# Patient Record
Sex: FEMALE | Race: WHITE | Employment: OTHER
[De-identification: names, ages, dates, MRNs, and addresses within clinical notes are randomized per-mention and may not be internally consistent; named-entity substitution may affect disease eponyms.]

---

## 2017-01-11 RX ORDER — POTASSIUM CHLORIDE 20 MEQ/1
20 TABLET, EXTENDED RELEASE ORAL 2 TIMES DAILY
Qty: 180 TABLET | Refills: 1 | Status: SHIPPED | OUTPATIENT
Start: 2017-01-11 | End: 2017-09-10 | Stop reason: SDUPTHER

## 2017-01-11 RX ORDER — LUBIPROSTONE 24 UG/1
24 CAPSULE, GELATIN COATED ORAL 2 TIMES DAILY
Qty: 180 CAPSULE | Refills: 1 | Status: SHIPPED | OUTPATIENT
Start: 2017-01-11 | End: 2017-05-22 | Stop reason: SDUPTHER

## 2017-02-07 ENCOUNTER — PROCEDURE VISIT (OUTPATIENT)
Dept: PODIATRY | Facility: CLINIC | Age: 82
End: 2017-02-07

## 2017-02-07 VITALS — BODY MASS INDEX: 20.49 KG/M2 | HEIGHT: 64 IN | WEIGHT: 120 LBS

## 2017-02-07 DIAGNOSIS — B35.1 DERMATOPHYTOSIS OF NAIL: Primary | ICD-10-CM

## 2017-02-07 DIAGNOSIS — M79.675 PAIN IN TOES OF BOTH FEET: ICD-10-CM

## 2017-02-07 DIAGNOSIS — M79.674 PAIN IN TOES OF BOTH FEET: ICD-10-CM

## 2017-02-07 PROCEDURE — 11721 DEBRIDE NAIL 6 OR MORE: CPT | Performed by: PODIATRIST

## 2017-02-07 PROCEDURE — 1036F TOBACCO NON-USER: CPT | Performed by: PODIATRIST

## 2017-02-21 ENCOUNTER — OFFICE VISIT (OUTPATIENT)
Dept: INTERNAL MEDICINE | Facility: CLINIC | Age: 82
End: 2017-02-21

## 2017-02-21 VITALS
WEIGHT: 113.8 LBS | HEIGHT: 64 IN | RESPIRATION RATE: 16 BRPM | SYSTOLIC BLOOD PRESSURE: 96 MMHG | DIASTOLIC BLOOD PRESSURE: 46 MMHG | HEART RATE: 64 BPM | BODY MASS INDEX: 19.43 KG/M2

## 2017-02-21 DIAGNOSIS — I10 ESSENTIAL HYPERTENSION: Primary | Chronic | ICD-10-CM

## 2017-02-21 DIAGNOSIS — I48.20 CHRONIC ATRIAL FIBRILLATION (HCC): ICD-10-CM

## 2017-02-21 DIAGNOSIS — I25.10 CORONARY ARTERY DISEASE INVOLVING NATIVE CORONARY ARTERY OF NATIVE HEART WITHOUT ANGINA PECTORIS: ICD-10-CM

## 2017-02-21 DIAGNOSIS — M15.9 PRIMARY OSTEOARTHRITIS INVOLVING MULTIPLE JOINTS: ICD-10-CM

## 2017-02-21 PROCEDURE — G8419 CALC BMI OUT NRM PARAM NOF/U: HCPCS | Performed by: INTERNAL MEDICINE

## 2017-02-21 PROCEDURE — 1123F ACP DISCUSS/DSCN MKR DOCD: CPT | Performed by: INTERNAL MEDICINE

## 2017-02-21 PROCEDURE — 1090F PRES/ABSN URINE INCON ASSESS: CPT | Performed by: INTERNAL MEDICINE

## 2017-02-21 PROCEDURE — G8427 DOCREV CUR MEDS BY ELIG CLIN: HCPCS | Performed by: INTERNAL MEDICINE

## 2017-02-21 PROCEDURE — 4040F PNEUMOC VAC/ADMIN/RCVD: CPT | Performed by: INTERNAL MEDICINE

## 2017-02-21 PROCEDURE — 99214 OFFICE O/P EST MOD 30 MIN: CPT | Performed by: INTERNAL MEDICINE

## 2017-02-21 PROCEDURE — G8484 FLU IMMUNIZE NO ADMIN: HCPCS | Performed by: INTERNAL MEDICINE

## 2017-02-21 PROCEDURE — G8598 ASA/ANTIPLAT THER USED: HCPCS | Performed by: INTERNAL MEDICINE

## 2017-02-21 PROCEDURE — 1036F TOBACCO NON-USER: CPT | Performed by: INTERNAL MEDICINE

## 2017-02-21 RX ORDER — HYDROCODONE BITARTRATE AND ACETAMINOPHEN 5; 325 MG/1; MG/1
1 TABLET ORAL EVERY 6 HOURS PRN
Qty: 50 TABLET | Refills: 0 | Status: SHIPPED | OUTPATIENT
Start: 2017-02-21 | End: 2017-06-27 | Stop reason: SDUPTHER

## 2017-02-21 ASSESSMENT — ENCOUNTER SYMPTOMS
ORTHOPNEA: 0
BLURRED VISION: 0
CONSTIPATION: 0
WHEEZING: 1
SHORTNESS OF BREATH: 0
ABDOMINAL PAIN: 0

## 2017-05-22 RX ORDER — LUBIPROSTONE 24 UG/1
CAPSULE, GELATIN COATED ORAL
Qty: 180 CAPSULE | Refills: 3 | Status: SHIPPED | OUTPATIENT
Start: 2017-05-22 | End: 2017-12-29 | Stop reason: ALTCHOICE

## 2017-06-21 ENCOUNTER — PROCEDURE VISIT (OUTPATIENT)
Dept: PODIATRY | Age: 82
End: 2017-06-21
Payer: MEDICARE

## 2017-06-21 VITALS
HEART RATE: 85 BPM | BODY MASS INDEX: 20.49 KG/M2 | WEIGHT: 120 LBS | HEIGHT: 64 IN | DIASTOLIC BLOOD PRESSURE: 62 MMHG | SYSTOLIC BLOOD PRESSURE: 113 MMHG

## 2017-06-21 DIAGNOSIS — B35.1 DERMATOPHYTOSIS OF NAIL: Primary | ICD-10-CM

## 2017-06-21 DIAGNOSIS — M79.674 PAIN IN TOES OF BOTH FEET: ICD-10-CM

## 2017-06-21 DIAGNOSIS — M79.675 PAIN IN TOES OF BOTH FEET: ICD-10-CM

## 2017-06-21 PROCEDURE — 11721 DEBRIDE NAIL 6 OR MORE: CPT | Performed by: PODIATRIST

## 2017-06-21 PROCEDURE — 1036F TOBACCO NON-USER: CPT | Performed by: PODIATRIST

## 2017-06-21 RX ORDER — DILTIAZEM HYDROCHLORIDE 120 MG/1
CAPSULE, COATED, EXTENDED RELEASE ORAL
COMMUNITY
Start: 2017-05-22 | End: 2018-08-20 | Stop reason: ALTCHOICE

## 2017-06-27 ENCOUNTER — OFFICE VISIT (OUTPATIENT)
Dept: PRIMARY CARE CLINIC | Age: 82
End: 2017-06-27
Payer: MEDICARE

## 2017-06-27 VITALS
BODY MASS INDEX: 19.29 KG/M2 | DIASTOLIC BLOOD PRESSURE: 80 MMHG | HEART RATE: 64 BPM | HEIGHT: 64 IN | SYSTOLIC BLOOD PRESSURE: 138 MMHG | WEIGHT: 113 LBS | RESPIRATION RATE: 14 BRPM

## 2017-06-27 DIAGNOSIS — I10 ESSENTIAL HYPERTENSION: Primary | Chronic | ICD-10-CM

## 2017-06-27 DIAGNOSIS — R32 URINARY INCONTINENCE, UNSPECIFIED TYPE: ICD-10-CM

## 2017-06-27 DIAGNOSIS — Z23 NEED FOR PROPHYLACTIC VACCINATION AGAINST STREPTOCOCCUS PNEUMONIAE (PNEUMOCOCCUS): ICD-10-CM

## 2017-06-27 DIAGNOSIS — I48.20 CHRONIC ATRIAL FIBRILLATION (HCC): ICD-10-CM

## 2017-06-27 DIAGNOSIS — G31.9 CEREBRAL ATROPHY (HCC): ICD-10-CM

## 2017-06-27 DIAGNOSIS — R53.83 FATIGUE, UNSPECIFIED TYPE: ICD-10-CM

## 2017-06-27 PROCEDURE — G8420 CALC BMI NORM PARAMETERS: HCPCS | Performed by: INTERNAL MEDICINE

## 2017-06-27 PROCEDURE — 99214 OFFICE O/P EST MOD 30 MIN: CPT | Performed by: INTERNAL MEDICINE

## 2017-06-27 PROCEDURE — 1036F TOBACCO NON-USER: CPT | Performed by: INTERNAL MEDICINE

## 2017-06-27 PROCEDURE — G0009 ADMIN PNEUMOCOCCAL VACCINE: HCPCS | Performed by: INTERNAL MEDICINE

## 2017-06-27 PROCEDURE — 0509F URINE INCON PLAN DOCD: CPT | Performed by: INTERNAL MEDICINE

## 2017-06-27 PROCEDURE — 4040F PNEUMOC VAC/ADMIN/RCVD: CPT | Performed by: INTERNAL MEDICINE

## 2017-06-27 PROCEDURE — G8427 DOCREV CUR MEDS BY ELIG CLIN: HCPCS | Performed by: INTERNAL MEDICINE

## 2017-06-27 PROCEDURE — G8598 ASA/ANTIPLAT THER USED: HCPCS | Performed by: INTERNAL MEDICINE

## 2017-06-27 PROCEDURE — 1123F ACP DISCUSS/DSCN MKR DOCD: CPT | Performed by: INTERNAL MEDICINE

## 2017-06-27 PROCEDURE — 1090F PRES/ABSN URINE INCON ASSESS: CPT | Performed by: INTERNAL MEDICINE

## 2017-06-27 PROCEDURE — 90670 PCV13 VACCINE IM: CPT | Performed by: INTERNAL MEDICINE

## 2017-06-27 RX ORDER — SOLIFENACIN SUCCINATE 5 MG/1
5 TABLET, FILM COATED ORAL DAILY
Qty: 30 TABLET | Refills: 3 | Status: SHIPPED | OUTPATIENT
Start: 2017-06-27 | End: 2017-08-07

## 2017-06-27 RX ORDER — HYDROCODONE BITARTRATE AND ACETAMINOPHEN 5; 325 MG/1; MG/1
1 TABLET ORAL EVERY 6 HOURS PRN
Qty: 50 TABLET | Refills: 0 | Status: SHIPPED | OUTPATIENT
Start: 2017-06-27 | End: 2017-10-24 | Stop reason: SDUPTHER

## 2017-06-27 ASSESSMENT — ENCOUNTER SYMPTOMS
BLURRED VISION: 0
ORTHOPNEA: 0
CONSTIPATION: 0
WHEEZING: 0
ABDOMINAL PAIN: 0
SHORTNESS OF BREATH: 0

## 2017-08-07 ENCOUNTER — TELEPHONE (OUTPATIENT)
Dept: PRIMARY CARE CLINIC | Age: 82
End: 2017-08-07

## 2017-08-07 RX ORDER — OXYBUTYNIN CHLORIDE 5 MG/1
5 TABLET ORAL 3 TIMES DAILY
Qty: 90 TABLET | Refills: 3 | Status: SHIPPED | OUTPATIENT
Start: 2017-08-07 | End: 2018-08-20

## 2017-09-11 RX ORDER — POTASSIUM CHLORIDE 20 MEQ/1
TABLET, EXTENDED RELEASE ORAL
Qty: 180 TABLET | Refills: 3 | Status: SHIPPED | OUTPATIENT
Start: 2017-09-11 | End: 2018-08-27 | Stop reason: ALTCHOICE

## 2017-10-24 ENCOUNTER — OFFICE VISIT (OUTPATIENT)
Dept: PRIMARY CARE CLINIC | Age: 82
End: 2017-10-24
Payer: MEDICARE

## 2017-10-24 VITALS
HEART RATE: 100 BPM | RESPIRATION RATE: 18 BRPM | SYSTOLIC BLOOD PRESSURE: 128 MMHG | DIASTOLIC BLOOD PRESSURE: 62 MMHG | WEIGHT: 114.2 LBS | BODY MASS INDEX: 19.5 KG/M2 | HEIGHT: 64 IN

## 2017-10-24 DIAGNOSIS — M15.9 PRIMARY OSTEOARTHRITIS INVOLVING MULTIPLE JOINTS: ICD-10-CM

## 2017-10-24 DIAGNOSIS — Z23 NEED FOR INFLUENZA VACCINATION: ICD-10-CM

## 2017-10-24 DIAGNOSIS — I10 ESSENTIAL HYPERTENSION: Primary | Chronic | ICD-10-CM

## 2017-10-24 DIAGNOSIS — I48.20 CHRONIC ATRIAL FIBRILLATION (HCC): ICD-10-CM

## 2017-10-24 DIAGNOSIS — M81.0 AGE-RELATED OSTEOPOROSIS WITHOUT CURRENT PATHOLOGICAL FRACTURE: ICD-10-CM

## 2017-10-24 DIAGNOSIS — Z95.0 PACEMAKER: ICD-10-CM

## 2017-10-24 PROCEDURE — 4040F PNEUMOC VAC/ADMIN/RCVD: CPT | Performed by: NURSE PRACTITIONER

## 2017-10-24 PROCEDURE — 1123F ACP DISCUSS/DSCN MKR DOCD: CPT | Performed by: NURSE PRACTITIONER

## 2017-10-24 PROCEDURE — G8598 ASA/ANTIPLAT THER USED: HCPCS | Performed by: NURSE PRACTITIONER

## 2017-10-24 PROCEDURE — 4005F PHARM THX FOR OP RXD: CPT | Performed by: NURSE PRACTITIONER

## 2017-10-24 PROCEDURE — G8427 DOCREV CUR MEDS BY ELIG CLIN: HCPCS | Performed by: NURSE PRACTITIONER

## 2017-10-24 PROCEDURE — G8510 SCR DEP NEG, NO PLAN REQD: HCPCS | Performed by: NURSE PRACTITIONER

## 2017-10-24 PROCEDURE — 3288F FALL RISK ASSESSMENT DOCD: CPT | Performed by: NURSE PRACTITIONER

## 2017-10-24 PROCEDURE — G0008 ADMIN INFLUENZA VIRUS VAC: HCPCS | Performed by: NURSE PRACTITIONER

## 2017-10-24 PROCEDURE — 90688 IIV4 VACCINE SPLT 0.5 ML IM: CPT | Performed by: NURSE PRACTITIONER

## 2017-10-24 PROCEDURE — 99214 OFFICE O/P EST MOD 30 MIN: CPT | Performed by: NURSE PRACTITIONER

## 2017-10-24 PROCEDURE — 1090F PRES/ABSN URINE INCON ASSESS: CPT | Performed by: NURSE PRACTITIONER

## 2017-10-24 PROCEDURE — G8484 FLU IMMUNIZE NO ADMIN: HCPCS | Performed by: NURSE PRACTITIONER

## 2017-10-24 PROCEDURE — G8420 CALC BMI NORM PARAMETERS: HCPCS | Performed by: NURSE PRACTITIONER

## 2017-10-24 PROCEDURE — 1036F TOBACCO NON-USER: CPT | Performed by: NURSE PRACTITIONER

## 2017-10-24 RX ORDER — HYDROCODONE BITARTRATE AND ACETAMINOPHEN 5; 325 MG/1; MG/1
1 TABLET ORAL EVERY 6 HOURS PRN
Qty: 50 TABLET | Refills: 0 | Status: SHIPPED | OUTPATIENT
Start: 2017-10-24 | End: 2018-08-20 | Stop reason: ALTCHOICE

## 2017-10-24 RX ORDER — ZOLEDRONIC ACID 5 MG/100ML
5 INJECTION, SOLUTION INTRAVENOUS ONCE
Qty: 100 ML | Refills: 0 | Status: SHIPPED | OUTPATIENT
Start: 2017-10-24 | End: 2018-08-20 | Stop reason: ALTCHOICE

## 2017-10-24 ASSESSMENT — ENCOUNTER SYMPTOMS
NAUSEA: 0
WHEEZING: 0
ABDOMINAL PAIN: 0
TROUBLE SWALLOWING: 0
VOMITING: 0
CONSTIPATION: 0
SHORTNESS OF BREATH: 0
SORE THROAT: 0
SINUS PRESSURE: 0
BLOOD IN STOOL: 0
DIARRHEA: 0
COUGH: 0

## 2017-10-24 ASSESSMENT — PATIENT HEALTH QUESTIONNAIRE - PHQ9
SUM OF ALL RESPONSES TO PHQ9 QUESTIONS 1 & 2: 0
1. LITTLE INTEREST OR PLEASURE IN DOING THINGS: 0
SUM OF ALL RESPONSES TO PHQ QUESTIONS 1-9: 0
2. FEELING DOWN, DEPRESSED OR HOPELESS: 0

## 2017-10-24 NOTE — PROGRESS NOTES
Chronic Disease Visit Information    BP Readings from Last 3 Encounters:   06/27/17 138/80   06/21/17 113/62   02/21/17 (!) 96/46          LDL Calculated (mg/dL)   Date Value   02/26/2014 165 (A)     HDL (mg/dL)   Date Value   02/26/2014 62     BUN (mg/dL)   Date Value   08/10/2013 16     CREATININE (mg/dL)   Date Value   08/10/2013 0.77     Glucose (mg/dL)   Date Value   08/10/2013 126 (H)   05/01/2012 85            Have you changed or started any medications since your last visit including any over-the-counter medicines, vitamins, or herbal medicines? no   Are you having any side effects from any of your medications? -  no  Have you stopped taking any of your medications? Is so, why? -  no    Have you seen any other physician or provider since your last visit? Yes - Records Requested  Have you had any other diagnostic tests since your last visit? No  Have you been seen in the emergency room and/or had an admission to a hospital since we last saw you? Yes - Records Requested  Have you had your annual diabetic retinal (eye) exam? No, na  Have you had your routine dental cleaning in the past 6 months? no    Have you activated your [employer] account? If not, what are your barriers?  No: declined     Patient Care Team:  Seema Pedro DO as PCP - General (Internal Medicine)  Seema Pedro DO as PCP - [facility] Attributed Provider         Medical History Review  Past Medical, Family, and Social History reviewed and does contribute to the patient presenting condition    Health Maintenance   Topic Date Due    Flu vaccine (1) 09/01/2017    DTaP/Tdap/Td vaccine (1 - Tdap) 02/21/2018 (Originally 7/10/1945)    Pneumococcal low/med risk (2 of 2 - PPSV23) 06/27/2018    Zostavax vaccine  Addressed

## 2017-10-24 NOTE — PROGRESS NOTES
Used    Alcohol use No      Current Outpatient Prescriptions   Medication Sig Dispense Refill    zoledronic acid (RECLAST) 5 MG/100ML SOLN Infuse 100 mLs intravenously once for 1 dose 100 mL 0    HYDROcodone-acetaminophen (NORCO) 5-325 MG per tablet Take 1 tablet by mouth every 6 hours as needed for Pain . 50 tablet 0    Misc. Devices MISC Provide handicap placard, expires 5 years from this date on 10-  Medical conditions prevent the ability to walk long distances 1 Device 0    potassium chloride (KLOR-CON M) 20 MEQ extended release tablet Take 1 tablet by mouth two  times daily 180 tablet 3    oxybutynin (DITROPAN) 5 MG tablet Take 1 tablet by mouth 3 times daily 90 tablet 3    diltiazem (CARDIZEM CD) 120 MG extended release capsule       AMITIZA 24 MCG capsule Take 1 capsule by mouth two times daily 180 capsule 3    ALPRAZolam (XANAX) 0.25 MG tablet TK 1 T PO  QD PRN  2    diclofenac sodium (VOLTAREN) 1 % GEL Apply 4 g topically 4 times daily 5 Tube 3    lisinopril (PRINIVIL;ZESTRIL) 20 MG tablet TAKE 1 TABLET EVERY 12 HOURS 1850 tablet 1    prednisoLONE acetate (PRED FORTE) 1 % ophthalmic suspension   0    trimethoprim-polymyxin b (POLYTRIM) 11966-9.1 UNIT/ML-% ophthalmic solution   1    Magnesium Hydroxide (MILK OF MAGNESIA PO) Take by mouth 1 teaspoon every morning      Multiple Minerals-Vitamins (IRMA MAG ZINC +D3 PO) Take  by mouth 2 times daily.  apixaban (ELIQUIS) 2.5 MG TABS tablet Take  by mouth 2 times daily.  Simethicone (GAS RELIEF PO) Take  by mouth as needed.  polyethylene glycol (GLYCOLAX) packet Take 17 g by mouth daily as needed.  Omega-3 Fatty Acids (FISH OIL) 1200 MG CAPS Take 1,200 capsules by mouth daily.  Methylcellulose, Laxative, (HM FIBER) 500 MG TABS Take 500 capsules by mouth 2 times daily. Taking 2 capsules twice day       No current facility-administered medications for this visit.       Allergies   Allergen Reactions    Plavix normal. She exhibits no distension. Musculoskeletal: Normal range of motion. Neurological: She is alert and oriented to person, place, and time. Dysphasia, writes on board to help with communication   Skin: Skin is warm and dry. Psychiatric: She has a normal mood and affect. Her behavior is normal. Judgment and thought content normal.     /62 (Site: Left Arm, Position: Sitting, Cuff Size: Medium Adult)   Pulse 100 Comment: regulat irreg  Resp 18   Ht 5' 3.75\" (1.619 m)   Wt 114 lb 3.2 oz (51.8 kg)   BMI 19.76 kg/m²     Assessment:      1. Essential hypertension     2. Need for influenza vaccination  INFLUENZA, QUADV, 3 YRS AND OLDER, IM, MDV, 0.5ML (FLUZONE QUADV)   3. Primary osteoarthritis involving multiple joints     4. Age-related osteoporosis without current pathological fracture  zoledronic acid (RECLAST) 5 MG/100ML SOLN   5. Chronic atrial fibrillation (HCC)     6. Pacemaker               Plan:      Return in about 6 months (around 4/24/2018). Orders Placed This Encounter   Medications    zoledronic acid (RECLAST) 5 MG/100ML SOLN     Sig: Infuse 100 mLs intravenously once for 1 dose     Dispense:  100 mL     Refill:  0     Diagnosis Osteoporosis, post menopausal    HYDROcodone-acetaminophen (NORCO) 5-325 MG per tablet     Sig: Take 1 tablet by mouth every 6 hours as needed for Pain . Dispense:  50 tablet     Refill:  0    Misc. Devices MISC     Sig: Provide handicap placard, expires 5 years from this date on 10-  Medical conditions prevent the ability to walk long distances     Dispense:  1 Device     Refill:  0      HTN-well controlled, cont current meds, due for labs-reprinted from last visit  Osteoarthritis -has generalized pain, uses norco 1-2 times per day  Osteoporosis-rx printed for reclast, daughter will call to schedule infusion. Reviewed xrays from ED on right arm-no fractures from fall.  Cont to monitor  Afib, p acer-stable, has follow up with cardio coming

## 2017-11-07 ENCOUNTER — PROCEDURE VISIT (OUTPATIENT)
Dept: PODIATRY | Age: 82
End: 2017-11-07
Payer: MEDICARE

## 2017-11-07 VITALS
BODY MASS INDEX: 22.66 KG/M2 | WEIGHT: 120 LBS | DIASTOLIC BLOOD PRESSURE: 73 MMHG | SYSTOLIC BLOOD PRESSURE: 135 MMHG | HEIGHT: 61 IN | HEART RATE: 83 BPM

## 2017-11-07 DIAGNOSIS — M79.675 PAIN IN TOES OF BOTH FEET: ICD-10-CM

## 2017-11-07 DIAGNOSIS — B35.1 DERMATOPHYTOSIS OF NAIL: Primary | ICD-10-CM

## 2017-11-07 DIAGNOSIS — M79.674 PAIN IN TOES OF BOTH FEET: ICD-10-CM

## 2017-11-07 PROCEDURE — 11721 DEBRIDE NAIL 6 OR MORE: CPT | Performed by: PODIATRIST

## 2017-12-29 ENCOUNTER — OFFICE VISIT (OUTPATIENT)
Dept: PRIMARY CARE CLINIC | Age: 82
End: 2017-12-29
Payer: MEDICARE

## 2017-12-29 VITALS
OXYGEN SATURATION: 98 % | WEIGHT: 114 LBS | HEART RATE: 67 BPM | HEIGHT: 63 IN | DIASTOLIC BLOOD PRESSURE: 64 MMHG | BODY MASS INDEX: 20.2 KG/M2 | RESPIRATION RATE: 17 BRPM | SYSTOLIC BLOOD PRESSURE: 116 MMHG

## 2017-12-29 DIAGNOSIS — K59.00 CONSTIPATION, UNSPECIFIED CONSTIPATION TYPE: Primary | ICD-10-CM

## 2017-12-29 DIAGNOSIS — R19.7 DIARRHEA, UNSPECIFIED TYPE: ICD-10-CM

## 2017-12-29 DIAGNOSIS — Z48.02 ENCOUNTER FOR STAPLE REMOVAL: ICD-10-CM

## 2017-12-29 PROCEDURE — 99214 OFFICE O/P EST MOD 30 MIN: CPT | Performed by: NURSE PRACTITIONER

## 2017-12-29 PROCEDURE — 1123F ACP DISCUSS/DSCN MKR DOCD: CPT | Performed by: NURSE PRACTITIONER

## 2017-12-29 PROCEDURE — G8427 DOCREV CUR MEDS BY ELIG CLIN: HCPCS | Performed by: NURSE PRACTITIONER

## 2017-12-29 PROCEDURE — G8420 CALC BMI NORM PARAMETERS: HCPCS | Performed by: NURSE PRACTITIONER

## 2017-12-29 PROCEDURE — 1090F PRES/ABSN URINE INCON ASSESS: CPT | Performed by: NURSE PRACTITIONER

## 2017-12-29 PROCEDURE — 1036F TOBACCO NON-USER: CPT | Performed by: NURSE PRACTITIONER

## 2017-12-29 PROCEDURE — 4040F PNEUMOC VAC/ADMIN/RCVD: CPT | Performed by: NURSE PRACTITIONER

## 2017-12-29 PROCEDURE — G8484 FLU IMMUNIZE NO ADMIN: HCPCS | Performed by: NURSE PRACTITIONER

## 2017-12-29 PROCEDURE — G8598 ASA/ANTIPLAT THER USED: HCPCS | Performed by: NURSE PRACTITIONER

## 2017-12-29 ASSESSMENT — ENCOUNTER SYMPTOMS
BACK PAIN: 0
ABDOMINAL PAIN: 0
DIARRHEA: 1
RECTAL PAIN: 1
ANAL BLEEDING: 1
SHORTNESS OF BREATH: 0
COUGH: 0

## 2017-12-29 NOTE — PROGRESS NOTES
oxybutynin (DITROPAN) 5 MG tablet Take 1 tablet by mouth 3 times daily 90 tablet 3    diltiazem (CARDIZEM CD) 120 MG extended release capsule       ALPRAZolam (XANAX) 0.25 MG tablet TK 1 T PO  QD PRN  2    lisinopril (PRINIVIL;ZESTRIL) 20 MG tablet TAKE 1 TABLET EVERY 12 HOURS 1850 tablet 1    prednisoLONE acetate (PRED FORTE) 1 % ophthalmic suspension   0    trimethoprim-polymyxin b (POLYTRIM) 38929-5.1 UNIT/ML-% ophthalmic solution   1    Magnesium Hydroxide (MILK OF MAGNESIA PO) Take by mouth 1 teaspoon every morning      Multiple Minerals-Vitamins (IRMA MAG ZINC +D3 PO) Take  by mouth 2 times daily.  apixaban (ELIQUIS) 2.5 MG TABS tablet Take  by mouth 2 times daily.  Simethicone (GAS RELIEF PO) Take  by mouth as needed.  polyethylene glycol (GLYCOLAX) packet Take 17 g by mouth daily as needed.  Omega-3 Fatty Acids (FISH OIL) 1200 MG CAPS Take 1,200 capsules by mouth daily.  Methylcellulose, Laxative, (HM FIBER) 500 MG TABS Take 500 capsules by mouth 2 times daily. Taking 2 capsules twice day      zoledronic acid (RECLAST) 5 MG/100ML SOLN Infuse 100 mLs intravenously once for 1 dose 100 mL 0    diclofenac sodium (VOLTAREN) 1 % GEL Apply 4 g topically 4 times daily 5 Tube 3     No current facility-administered medications for this visit. Allergies   Allergen Reactions    Plavix [Clopidogrel] Rash    Toprol Xl [Metoprolol] Rash       Health Maintenance   Topic Date Due    DTaP/Tdap/Td vaccine (1 - Tdap) 02/21/2018 (Originally 7/10/1945)    Pneumococcal low/med risk (2 of 2 - PPSV23) 06/27/2018    Zostavax vaccine  Addressed    Flu vaccine  Completed       Subjective:      Review of Systems   Constitutional: Positive for fatigue. Negative for chills and fever. HENT: Negative for congestion. Respiratory: Negative for cough and shortness of breath. Cardiovascular: Negative for chest pain and palpitations.    Gastrointestinal: Positive for anal bleeding, diarrhea and the face-to-face time in counseling, explanation of diagnosis, planning of further management, and answering all questions. Orders Placed This Encounter   Medications    DISCONTD: linaclotide (LINZESS) 145 MCG capsule     Sig: Take 1 capsule by mouth every morning (before breakfast)     Dispense:  30 capsule     Refill:  3    hydrocortisone (ANUSOL-HC) 2.5 % rectal cream     Sig: Place rectally 2 times daily. Dispense:  28 g     Refill:  1    Linaclotide (LINZESS) 72 MCG CAPS     Sig: Take 72 mg by mouth daily     Dispense:  30 capsule     Refill:  3       Patient given educational materials - see patient instructions. Discussed use, benefit, and side effects of prescribed medications. All patient questions answered. Pt voiced understanding. Reviewed health maintenance. Instructed to continue current medications, diet and exercise. Patient agreed with treatment plan. Follow up as directed.       Electronically signed by Earl Esparza CNP on 12/29/2017 at 4:04 PM

## 2018-04-11 ENCOUNTER — TELEPHONE (OUTPATIENT)
Dept: PRIMARY CARE CLINIC | Age: 83
End: 2018-04-11

## 2018-04-20 ENCOUNTER — TELEPHONE (OUTPATIENT)
Dept: PRIMARY CARE CLINIC | Age: 83
End: 2018-04-20

## 2018-04-20 DIAGNOSIS — M15.9 GENERALIZED OA: Primary | ICD-10-CM

## 2018-04-20 DIAGNOSIS — G31.9 CEREBRAL ATROPHY (HCC): ICD-10-CM

## 2018-04-26 ENCOUNTER — TELEPHONE (OUTPATIENT)
Dept: PRIMARY CARE CLINIC | Age: 83
End: 2018-04-26

## 2018-06-28 ENCOUNTER — TELEPHONE (OUTPATIENT)
Dept: PRIMARY CARE CLINIC | Age: 83
End: 2018-06-28

## 2018-08-20 ENCOUNTER — OFFICE VISIT (OUTPATIENT)
Dept: PRIMARY CARE CLINIC | Age: 83
End: 2018-08-20
Payer: MEDICARE

## 2018-08-20 VITALS
HEIGHT: 63 IN | HEART RATE: 78 BPM | SYSTOLIC BLOOD PRESSURE: 128 MMHG | RESPIRATION RATE: 18 BRPM | WEIGHT: 112.2 LBS | BODY MASS INDEX: 19.88 KG/M2 | DIASTOLIC BLOOD PRESSURE: 62 MMHG

## 2018-08-20 DIAGNOSIS — R53.1 RIGHT SIDED WEAKNESS: Primary | ICD-10-CM

## 2018-08-20 DIAGNOSIS — Z23 NEED FOR PNEUMOCOCCAL VACCINATION: ICD-10-CM

## 2018-08-20 DIAGNOSIS — I10 ESSENTIAL HYPERTENSION: Chronic | ICD-10-CM

## 2018-08-20 DIAGNOSIS — G31.9 CEREBRAL ATROPHY (HCC): ICD-10-CM

## 2018-08-20 DIAGNOSIS — M15.9 PRIMARY OSTEOARTHRITIS INVOLVING MULTIPLE JOINTS: ICD-10-CM

## 2018-08-20 DIAGNOSIS — I48.20 CHRONIC ATRIAL FIBRILLATION (HCC): ICD-10-CM

## 2018-08-20 PROCEDURE — G8420 CALC BMI NORM PARAMETERS: HCPCS | Performed by: NURSE PRACTITIONER

## 2018-08-20 PROCEDURE — 99214 OFFICE O/P EST MOD 30 MIN: CPT | Performed by: NURSE PRACTITIONER

## 2018-08-20 PROCEDURE — 4040F PNEUMOC VAC/ADMIN/RCVD: CPT | Performed by: NURSE PRACTITIONER

## 2018-08-20 PROCEDURE — 90732 PPSV23 VACC 2 YRS+ SUBQ/IM: CPT | Performed by: NURSE PRACTITIONER

## 2018-08-20 PROCEDURE — G8427 DOCREV CUR MEDS BY ELIG CLIN: HCPCS | Performed by: NURSE PRACTITIONER

## 2018-08-20 PROCEDURE — G8598 ASA/ANTIPLAT THER USED: HCPCS | Performed by: NURSE PRACTITIONER

## 2018-08-20 PROCEDURE — G0009 ADMIN PNEUMOCOCCAL VACCINE: HCPCS | Performed by: NURSE PRACTITIONER

## 2018-08-20 PROCEDURE — 1123F ACP DISCUSS/DSCN MKR DOCD: CPT | Performed by: NURSE PRACTITIONER

## 2018-08-20 PROCEDURE — 1090F PRES/ABSN URINE INCON ASSESS: CPT | Performed by: NURSE PRACTITIONER

## 2018-08-20 PROCEDURE — 1101F PT FALLS ASSESS-DOCD LE1/YR: CPT | Performed by: NURSE PRACTITIONER

## 2018-08-20 PROCEDURE — 1036F TOBACCO NON-USER: CPT | Performed by: NURSE PRACTITIONER

## 2018-08-20 PROCEDURE — 1111F DSCHRG MED/CURRENT MED MERGE: CPT | Performed by: NURSE PRACTITIONER

## 2018-08-20 RX ORDER — HYDROCODONE BITARTRATE AND ACETAMINOPHEN 5; 325 MG/1; MG/1
1 TABLET ORAL EVERY 8 HOURS PRN
Qty: 50 TABLET | Refills: 0 | Status: SHIPPED | OUTPATIENT
Start: 2018-08-20 | End: 2019-01-07 | Stop reason: SDUPTHER

## 2018-08-20 RX ORDER — DILTIAZEM HYDROCHLORIDE 360 MG/1
360 CAPSULE, EXTENDED RELEASE ORAL
COMMUNITY
Start: 2018-03-06

## 2018-08-20 ASSESSMENT — ENCOUNTER SYMPTOMS
COUGH: 0
NAUSEA: 0
VOMITING: 0
SHORTNESS OF BREATH: 0
BLOOD IN STOOL: 0
SINUS PRESSURE: 0
TROUBLE SWALLOWING: 0
DIARRHEA: 0
ABDOMINAL PAIN: 0
SORE THROAT: 0
CONSTIPATION: 0
WHEEZING: 0

## 2018-08-20 NOTE — PROGRESS NOTES
TONSILLECTOMY AND ADENOIDECTOMY         History reviewed. No pertinent family history. Social History   Substance Use Topics    Smoking status: Former Smoker    Smokeless tobacco: Never Used    Alcohol use No      Current Outpatient Prescriptions   Medication Sig Dispense Refill    diltiazem (CARDIZEM CD) 360 MG extended release capsule Take 360 mg by mouth      HYDROcodone-acetaminophen (NORCO) 5-325 MG per tablet Take 1 tablet by mouth every 8 hours as needed for Pain for up to 30 days. . 50 tablet 0    Linaclotide (LINZESS) 72 MCG CAPS Take 72 mg by mouth daily 90 capsule 3    Misc. Devices MISC Provide handicap placard, expires 5 years from this date on 10-  Medical conditions prevent the ability to walk long distances 1 Device 0    lisinopril (PRINIVIL;ZESTRIL) 20 MG tablet TAKE 1 TABLET EVERY 12 HOURS 1850 tablet 1    apixaban (ELIQUIS) 2.5 MG TABS tablet Take  by mouth 2 times daily.  Methylcellulose, Laxative, (HM FIBER) 500 MG TABS Take 500 capsules by mouth 2 times daily. Taking 2 capsules twice day      potassium chloride (KLOR-CON M) 20 MEQ extended release tablet Take 1 tablet by mouth two  times daily 180 tablet 3     No current facility-administered medications for this visit. Allergies   Allergen Reactions    Plavix [Clopidogrel] Rash    Toprol Xl [Metoprolol] Rash       Health Maintenance   Topic Date Due    DTaP/Tdap/Td vaccine (1 - Tdap) 07/10/1945    Shingles Vaccine (1 of 2 - 2 Dose Series) 07/10/1976    Flu vaccine (1) 09/01/2018    Pneumococcal low/med risk  Completed       Subjective:      Review of Systems   Constitutional: Negative for appetite change, chills, fatigue, fever and unexpected weight change. HENT: Negative for congestion, ear pain, hearing loss, sinus pressure, sore throat and trouble swallowing. Eyes: Negative for visual disturbance. Respiratory: Negative for cough, shortness of breath and wheezing.     Cardiovascular: Negative for chest pain, palpitations and leg swelling. Gastrointestinal: Negative for abdominal pain, blood in stool, constipation, diarrhea, nausea and vomiting. Endocrine: Negative for cold intolerance, heat intolerance, polydipsia, polyphagia and polyuria. Genitourinary: Negative for difficulty urinating, frequency, hematuria and urgency. Musculoskeletal: Positive for gait problem. Negative for arthralgias and myalgias. Skin: Negative for rash. Allergic/Immunologic: Negative for environmental allergies. Neurological: Positive for speech difficulty and weakness. Negative for dizziness, light-headedness and headaches. Psychiatric/Behavioral: Negative for confusion. The patient is not nervous/anxious. Objective:     Physical Exam   Constitutional: She is oriented to person, place, and time. She appears well-developed and well-nourished. HENT:   Head: Normocephalic. Eyes: Pupils are equal, round, and reactive to light. Conjunctivae and EOM are normal.   Neck: Normal range of motion. Cardiovascular: Normal rate, regular rhythm, normal heart sounds and intact distal pulses. No murmur heard. Pulmonary/Chest: Effort normal and breath sounds normal. She has no wheezes. Abdominal: Soft. Bowel sounds are normal. She exhibits no distension. Musculoskeletal: Normal range of motion. Neurological: She is alert and oriented to person, place, and time. Right-sided weakness   Skin: Skin is warm and dry. Psychiatric: She has a normal mood and affect. Her behavior is normal. Judgment and thought content normal.     /62 (Site: Left Arm, Position: Sitting, Cuff Size: Medium Adult)   Pulse 78   Resp 18   Ht 5' 3\" (1.6 m)   Wt 112 lb 3.2 oz (50.9 kg)   BMI 19.88 kg/m²     Assessment:       Diagnosis Orders   1. Right sided weakness  CBC Auto Differential    Comprehensive Metabolic Panel   2.  Need for pneumococcal vaccination  Pneumococcal polysaccharide vaccine 23-valent greater than or equal to

## 2018-08-21 DIAGNOSIS — R53.1 RIGHT SIDED WEAKNESS: ICD-10-CM

## 2018-08-21 DIAGNOSIS — I10 ESSENTIAL HYPERTENSION: Chronic | ICD-10-CM

## 2018-08-27 ENCOUNTER — OFFICE VISIT (OUTPATIENT)
Dept: PODIATRY | Age: 83
End: 2018-08-27
Payer: MEDICARE

## 2018-08-27 VITALS
DIASTOLIC BLOOD PRESSURE: 61 MMHG | WEIGHT: 112 LBS | BODY MASS INDEX: 21.14 KG/M2 | HEART RATE: 93 BPM | SYSTOLIC BLOOD PRESSURE: 128 MMHG | HEIGHT: 61 IN

## 2018-08-27 DIAGNOSIS — M79.675 PAIN IN TOES OF BOTH FEET: ICD-10-CM

## 2018-08-27 DIAGNOSIS — B35.1 DERMATOPHYTOSIS OF NAIL: Primary | ICD-10-CM

## 2018-08-27 DIAGNOSIS — M79.674 PAIN IN TOES OF BOTH FEET: ICD-10-CM

## 2018-08-27 PROCEDURE — 11721 DEBRIDE NAIL 6 OR MORE: CPT | Performed by: PODIATRIST

## 2018-08-27 PROCEDURE — 99999 PR OFFICE/OUTPT VISIT,PROCEDURE ONLY: CPT | Performed by: PODIATRIST

## 2018-08-27 NOTE — PROGRESS NOTES
Procedure Laterality Date    APPENDECTOMY      CARDIAC CATHETERIZATION      COLONOSCOPY      FRACTURE SURGERY      broke pelvis    HIP SURGERY      right hip    HYSTERECTOMY      TONSILLECTOMY AND ADENOIDECTOMY         History reviewed. No pertinent family history. Social History   Substance Use Topics    Smoking status: Former Smoker    Smokeless tobacco: Never Used    Alcohol use No       Lower Extremity Physical Examination:     Vitals:   Vitals:    08/27/18 1403   BP: 128/61   Pulse:      General: AAO x 3 in NAD. Vascular: DP and PT pulses palpable 2/4, Bilateral.  CFT <3 seconds, Bilateral.  Hair growth absent to the level of the digits, Bilateral.  Edema absent, Bilateral.  Varicosities present, Bilateral. Erythema absent, Bilateral    Neurological: Sensation intact to light touch to level of digits, Bilateral.  Protective sensation intact 10/10 sites via 5.07/10g Oxford-Marito Monofilament, Bilateral.  negative Tinel's, Bilateral.  negative Valleix sign, Bilateral.      Musculoskeletal: Muscle strength 5/5, Bilateral.  Pain present upon palpation of nail plates, Bilateral.     Integument: Warm, dry, supple, Bilateral.  Open lesion absent, Bilateral.  Interdigital maceration absent to web spaces 4, Bilateral.  Nails 5 left and 5 right thickened > 2.0 mm, dystrophic and crumbly, discolored with subungual debris.   Fissures absent, Bilateral.   Sites of Onychomycosis Involvement (Check affected area)  [x] [] [] [x] [x] [x] [x] [] [] [x]  5 4 3 2 1 1 2 3 4 5                          Right                                        Left    Thickness  [x] [] [] [x] [x] [x] [x] [] [] [x]  5 4 3 2 1 1 2 3 4 5                         Right                                        Left    Dystrophic Changes                                                                 [x] [] [] [x] [x] [x] [x] [] [] [x]  5 4 3 2 1 1 2 3 4 5                         Right

## 2018-09-05 ENCOUNTER — TELEPHONE (OUTPATIENT)
Dept: PRIMARY CARE CLINIC | Age: 83
End: 2018-09-05

## 2019-01-07 ENCOUNTER — TELEPHONE (OUTPATIENT)
Dept: PRIMARY CARE CLINIC | Age: 84
End: 2019-01-07

## 2019-01-07 DIAGNOSIS — M15.9 PRIMARY OSTEOARTHRITIS INVOLVING MULTIPLE JOINTS: ICD-10-CM

## 2019-01-07 RX ORDER — HYDROCODONE BITARTRATE AND ACETAMINOPHEN 5; 325 MG/1; MG/1
1 TABLET ORAL EVERY 8 HOURS PRN
Qty: 50 TABLET | Refills: 0 | OUTPATIENT
Start: 2019-01-07 | End: 2019-02-06

## 2019-01-07 RX ORDER — HYDROCODONE BITARTRATE AND ACETAMINOPHEN 5; 325 MG/1; MG/1
1 TABLET ORAL EVERY 8 HOURS PRN
Qty: 50 TABLET | Refills: 0 | Status: SHIPPED | OUTPATIENT
Start: 2019-01-07 | End: 2019-03-22 | Stop reason: SDUPTHER

## 2019-01-07 RX ORDER — AMITRIPTYLINE HYDROCHLORIDE 10 MG/1
10 TABLET, FILM COATED ORAL NIGHTLY
Qty: 30 TABLET | Refills: 5 | Status: SHIPPED | OUTPATIENT
Start: 2019-01-07 | End: 2019-01-30 | Stop reason: SDUPTHER

## 2019-01-30 RX ORDER — AMITRIPTYLINE HYDROCHLORIDE 10 MG/1
TABLET, FILM COATED ORAL
Qty: 90 TABLET | Refills: 5 | Status: SHIPPED | OUTPATIENT
Start: 2019-01-30 | End: 2019-03-22 | Stop reason: SDUPTHER

## 2019-03-22 ENCOUNTER — TELEPHONE (OUTPATIENT)
Dept: PRIMARY CARE CLINIC | Age: 84
End: 2019-03-22

## 2019-03-22 DIAGNOSIS — M15.9 PRIMARY OSTEOARTHRITIS INVOLVING MULTIPLE JOINTS: ICD-10-CM

## 2019-03-22 RX ORDER — AMITRIPTYLINE HYDROCHLORIDE 25 MG/1
25 TABLET, FILM COATED ORAL NIGHTLY
Qty: 90 TABLET | Refills: 3 | Status: SHIPPED | OUTPATIENT
Start: 2019-03-22

## 2019-03-22 RX ORDER — HYDROCODONE BITARTRATE AND ACETAMINOPHEN 5; 325 MG/1; MG/1
1 TABLET ORAL EVERY 8 HOURS PRN
Qty: 50 TABLET | Refills: 0 | Status: SHIPPED | OUTPATIENT
Start: 2019-03-22 | End: 2019-04-21

## 2019-04-05 ENCOUNTER — TELEPHONE (OUTPATIENT)
Dept: PRIMARY CARE CLINIC | Age: 84
End: 2019-04-05

## 2019-04-05 DIAGNOSIS — Z74.01 BEDRIDDEN: ICD-10-CM

## 2019-04-05 DIAGNOSIS — R53.1 WEAKNESS: Primary | ICD-10-CM

## 2019-04-05 NOTE — TELEPHONE ENCOUNTER
Patients daughter called, they have 3 people taking turns caring for Iveth Hernandez, last night her cousin was there and she kept her up all night clearing her throat so she is not sure if she is congested/full of fluid. Patient cannot speak,write, or walk any longer. She really does not want to take her to ER, can a [facility] home care referral be placed for nurse to come out and evaluate? Please advise.

## 2019-04-05 NOTE — TELEPHONE ENCOUNTER
[address] [place] [address],[address] and spoke to Jose Miguel Stoddard, per medicare patient must be seen 90 days prior or within 90 days after visit, called daughter back and she said her and her brother will somehow get her into office within 90 days. Please place home care orders and writer will fax ASAP and they will go out within 24 to 48 hours and daughter is aware of that, advised to take to ER if worsens.

## 2019-04-05 NOTE — TELEPHONE ENCOUNTER
[facility] called asking if they can get pt set to receive skilled nursing and social work? Is so, then the soonest it will begin is Monday, but sooner if possible? I advised yes if you agree, if not, then I will call back to let him know. Please advise on any further recommendations.

## 2019-04-08 ENCOUNTER — TELEPHONE (OUTPATIENT)
Dept: PRIMARY CARE CLINIC | Age: 84
End: 2019-04-08

## 2019-04-08 NOTE — TELEPHONE ENCOUNTER
Arnulfo Malone, from [facility] called, and wanted to give you an update, on patient. Dann Romero is in declining health, wont talk, or walk. Arnulfo Malone went out today, and took an assessment. Doug Zavala patient's daughter asked about Hospice, and she gave information regarding this. Arnulfo Malone has planned two more nursing assessments, and she will be out of visit Dann Romero.

## 2020-09-17 ENCOUNTER — TELEPHONE (OUTPATIENT)
Dept: PRIMARY CARE CLINIC | Age: 85
End: 2020-09-17

## 2020-09-17 NOTE — TELEPHONE ENCOUNTER
[facility]osp[facility] [address] [place] [address] called to let us know Pt has passed on 09/17/2020